# Patient Record
Sex: MALE | URBAN - METROPOLITAN AREA
[De-identification: names, ages, dates, MRNs, and addresses within clinical notes are randomized per-mention and may not be internally consistent; named-entity substitution may affect disease eponyms.]

---

## 2022-04-01 ENCOUNTER — HOSPITAL ENCOUNTER (OUTPATIENT)
Dept: LAB | Age: 22
Discharge: HOME | End: 2022-04-01

## 2022-04-01 PROCEDURE — 88305 TISSUE EXAM BY PATHOLOGIST: CPT

## 2022-05-05 PROBLEM — R63.4 WEIGHT LOSS: Status: RESOLVED | Noted: 2017-05-26 | Resolved: 2022-05-05

## 2022-05-05 PROBLEM — S93.402A SPRAIN OF LEFT ANKLE: Status: ACTIVE | Noted: 2022-05-05

## 2022-05-05 PROBLEM — R25.1 TREMOR OF RIGHT HAND: Status: ACTIVE | Noted: 2022-05-05

## 2022-05-05 PROBLEM — R63.4 WEIGHT LOSS: Status: ACTIVE | Noted: 2017-05-26

## 2022-05-05 PROBLEM — K50.813 CROHN'S DISEASE OF BOTH SMALL AND LARGE INTESTINE WITH FISTULA (HCC): Status: RESOLVED | Noted: 2022-05-02 | Resolved: 2022-05-05

## 2022-05-05 PROBLEM — K50.813 CROHN'S DISEASE OF BOTH SMALL AND LARGE INTESTINE WITH FISTULA (HCC): Status: ACTIVE | Noted: 2022-05-02

## 2022-05-05 PROBLEM — R11.0 NAUSEA: Status: ACTIVE | Noted: 2022-05-05

## 2022-05-06 PROBLEM — E55.9 VITAMIN D DEFICIENCY: Status: ACTIVE | Noted: 2022-05-06

## 2022-05-23 ENCOUNTER — TELEMEDICINE (OUTPATIENT)
Dept: FAMILY MEDICINE CLINIC | Facility: CLINIC | Age: 22
End: 2022-05-23
Payer: COMMERCIAL

## 2022-05-23 DIAGNOSIS — K50.90 CROHN'S DISEASE WITHOUT COMPLICATION, UNSPECIFIED GASTROINTESTINAL TRACT LOCATION (HCC): Primary | ICD-10-CM

## 2022-05-23 DIAGNOSIS — Z23 ENCOUNTER FOR IMMUNIZATION: ICD-10-CM

## 2022-05-23 DIAGNOSIS — S93.402D SPRAIN OF LEFT ANKLE, UNSPECIFIED LIGAMENT, SUBSEQUENT ENCOUNTER: ICD-10-CM

## 2022-05-23 DIAGNOSIS — E55.9 VITAMIN D DEFICIENCY: ICD-10-CM

## 2022-05-23 DIAGNOSIS — R25.1 TREMOR OF RIGHT HAND: ICD-10-CM

## 2022-05-23 DIAGNOSIS — R11.0 NAUSEA: ICD-10-CM

## 2022-05-23 PROCEDURE — 99213 OFFICE O/P EST LOW 20 MIN: CPT | Performed by: NURSE PRACTITIONER

## 2022-05-23 RX ORDER — IBUPROFEN 200 MG
TABLET ORAL
COMMUNITY

## 2022-05-23 RX ORDER — ONDANSETRON 4 MG/1
TABLET, ORALLY DISINTEGRATING ORAL
COMMUNITY
Start: 2022-04-22

## 2022-05-23 SDOH — ECONOMIC STABILITY: FOOD INSECURITY: WITHIN THE PAST 12 MONTHS, YOU WORRIED THAT YOUR FOOD WOULD RUN OUT BEFORE YOU GOT MONEY TO BUY MORE.: NEVER TRUE

## 2022-05-23 SDOH — ECONOMIC STABILITY: FOOD INSECURITY: WITHIN THE PAST 12 MONTHS, THE FOOD YOU BOUGHT JUST DIDN'T LAST AND YOU DIDN'T HAVE MONEY TO GET MORE.: NEVER TRUE

## 2022-05-23 ASSESSMENT — ENCOUNTER SYMPTOMS
DIARRHEA: 0
STRIDOR: 0
BACK PAIN: 0
WHEEZING: 0
TROUBLE SWALLOWING: 0
ABDOMINAL DISTENTION: 0
BLOOD IN STOOL: 0
FACIAL SWELLING: 0
EYE PAIN: 0
SHORTNESS OF BREATH: 0
ANAL BLEEDING: 0
NAUSEA: 1
RECTAL PAIN: 0
EYES NEGATIVE: 1
VOICE CHANGE: 0
SINUS PAIN: 0
ABDOMINAL PAIN: 0
ALLERGIC/IMMUNOLOGIC NEGATIVE: 1
CONSTIPATION: 0
CHEST TIGHTNESS: 0
EYE DISCHARGE: 0
COUGH: 0
RHINORRHEA: 0
SINUS PRESSURE: 0
VOMITING: 0
SORE THROAT: 0
RESPIRATORY NEGATIVE: 1

## 2022-05-23 ASSESSMENT — SOCIAL DETERMINANTS OF HEALTH (SDOH): HOW HARD IS IT FOR YOU TO PAY FOR THE VERY BASICS LIKE FOOD, HOUSING, MEDICAL CARE, AND HEATING?: NOT VERY HARD

## 2022-05-23 NOTE — PATIENT INSTRUCTIONS
Patient Education        Benign Essential Tremor: Care Instructions  Your Care Instructions     Benign essential tremor is a medical term for shaking that you can't control. Your hand or fingers may shake when you lift a cup or point at something. Or your voice may shake when you speak. This type of tremor is not harmful. It isnot caused by a stroke or Parkinson's disease. Some things can affect how much you shake. For example, drinking or eating something with caffeine may make tremors worse for a while. Some medicines also can increase tremors. These include antidepressants and too much thyroid replacement. Talk to your doctor if you think one of your medicines makes yourtremors worse. If you are self-conscious about your tremors, there are some things you can doto reduce them or make them less noticeable. This includes taking medicine. Follow-up care is a key part of your treatment and safety. Be sure to make and go to all appointments, and call your doctor if you are having problems. It's also a good idea to know your test results and keep alist of the medicines you take. How can you care for yourself at home?  Take your medicines exactly as prescribed. Call your doctor if you think you are having a problem with your medicine. Some medicines that help control tremors have to be taken every day, even if you are not having tremors. You will get more details on the specific medicines your doctor prescribes.  Get plenty of rest.   Eat a balanced, healthy diet.  Try to reduce stress. Regular exercise and massages may help.  Limit alcohol. Heavy drinking can make your tremors worse.  Avoid drinks or foods with caffeine if they make your tremors worse. These include tea, cola, coffee, and chocolate.  Wear a heavy bracelet or watch. This adds a little weight to your hand. The extra weight may reduce tremors.  Drink from cups or glasses that are only half full.  You may also want to try drinking with a straw.  When should you call for help? Watch closely for changes in your health, and be sure to contact your doctor if:     You notice your tremors are getting worse.      You can't do your everyday activities because of your tremors.      You are sad and embarrassed about your shaking. Where can you learn more? Go to [email or web address] and sign in to your [employer] account. Enter B746 in the Super box to learn more about \"Benign Essential Tremor: Care Instructions. \"     If you do not have an account, please click on the \"Sign Up Now\" link. Current as of: December 13, 2021               Content Version: 13.2  © 2006-2022 Healthwise, [employer]. Care instructions adapted under license by [facility] ([facility]). If you have questions about a medical condition or this instruction, always ask your healthcare professional. [address] any warranty or liability for your use of this information.

## 2022-05-23 NOTE — PROGRESS NOTES
2022    TELEHEALTH EVALUATION -- Audio/Visual (During VHSVP-61 public health emergency)    HPI:    Corinne Belts (:  2000) has requested an audio/video evaluation for the following concern(s):    Pt for VV today for routine labs review. Pt reports feeling well today and has no particular concerns or complaints. Please see ROS and Assessment and Plan section for full details of all items addressed during today's assessment    Review of Systems   Constitutional: Negative. Negative for appetite change, chills, diaphoresis, fatigue, fever and unexpected weight change. HENT: Negative. Negative for congestion, ear discharge, ear pain, facial swelling, hearing loss, mouth sores, nosebleeds, postnasal drip, rhinorrhea, sinus pressure, sinus pain, sneezing, sore throat, tinnitus, trouble swallowing and voice change. Eyes: Negative. Negative for pain, discharge and visual disturbance. Respiratory: Negative. Negative for cough, chest tightness, shortness of breath, wheezing and stridor. Cardiovascular: Negative. Negative for chest pain, palpitations and leg swelling. Gastrointestinal: Positive for nausea. Negative for abdominal distention, abdominal pain, anal bleeding, blood in stool, constipation, diarrhea, rectal pain and vomiting. Under care of GI for Crohn's-symptoms well controlled except mild nausea in AM at times relieved by PRN Zofran. On Remicade infusions with GI    Endocrine: Negative. Genitourinary: Negative. Negative for decreased urine volume, difficulty urinating, dysuria, flank pain, frequency, genital sores, hematuria, penile discharge, penile pain, penile swelling, scrotal swelling, testicular pain and urgency. Musculoskeletal: Positive for arthralgias. Negative for back pain, gait problem, joint swelling, myalgias, neck pain and neck stiffness. Left ankle sprain-seen in ER at [facility]-x-ray neg. Minimal left ankle pain remains per pt. No edema.  Can bear weight and ambulate without issue. Is wearing brace when exercising. Feels much better per pt   Skin: Negative. Negative for pallor and rash. Allergic/Immunologic: Negative. Negative for environmental allergies. Neurological: Positive for tremors. Negative for dizziness, seizures, syncope, facial asymmetry, speech difficulty, weakness, light-headedness, numbness and headaches. Pt reports having intermittent mild tremor to right hand over past several months. Denies focal weakness or neuro defs. Pt states that he notices it more when he has not eaten recently. Denies any new or worsening symptoms since last appointment   Hematological: Negative. Negative for adenopathy. Does not bruise/bleed easily. Psychiatric/Behavioral: Negative. Negative for dysphoric mood, hallucinations, self-injury, sleep disturbance and suicidal ideas. The patient is not nervous/anxious.       Following labs reviewed with pt    Component      Latest Ref Rng & Units 5/5/2022 5/5/2022 5/5/2022 5/5/2022          11:59 AM 11:45 AM 11:45 AM 11:45 AM   GLUCOSE, FASTING,GF      65 - 99 mg/dL  94     BUN,BUNPL      6 - 20 mg/dL  10     Creatinine      0.76 - 1.27 mg/dL  0.81     EGFR      >59 mL/min/1.73  129     Bun/Cre Ratio      9 - 20 NA  12     Sodium      134 - 144 mmol/L  139     Potassium      3.5 - 5.2 mmol/L  4.5     Chloride      96 - 106 mmol/L  100     CO2      20 - 29 mmol/L  21     CALCIUM, SERUM, 064810      8.7 - 10.2 mg/dL  9.3     Total Protein      6.0 - 8.5 g/dL  7.8     Albumin      4.1 - 5.2 g/dL  4.3     Globulin, Total      1.5 - 4.5 g/dL  3.5     Albumin/Globulin Ratio      1.2 - 2.2 NA  1.2     Bilirubin      0.0 - 1.2 mg/dL  0.4     Alk Phos      44 - 121 IU/L  70     AST      0 - 40 IU/L  20     ALT      0 - 44 IU/L  17     WBC, POC      4.1 - 10.9 10:3/ul 6.3      Lymphocytes Absolute      0.6 - 4.1 10:3/ul 2.3      Mid Cells Absoulute POC      0.0 - 1.8 10:3/ul 0.5      Granulocytes Abs      2.0 - 7.8 10:3/ul 3.5      Lymphocyte %      10.0 - 58.5 % 36.4      Mid Cells %, POC      0.1 - 24.0 % 8.7      Granulocytes %, POC      37.0 - 92.0 % 54.9      RBC, POC      4.20 - 6.30 10:6/ul 5.06      Hemoglobin, POC      12.0 - 18.0 g/dL 14.3      Hematocrit, POC      37.0 - 51.0 % 42.9      MCV      80.0 - 97.0 fL 84.7      MCH      26.0 - 32.0 pg 28.3      MCHC      31.0 - 36.0 g/dL 33.3      RDW, POC      11.5 - 14.5 % 14.0      Platelet Count, POC      140 - 440 10:3/ul 263      MPV POC      0.0 - 49.9 fL 7.6      CHOLESTEROL, TOTAL, 917716      100 - 199 mg/dL   163    Triglycerides      0 - 149 mg/dL   52    HDL Cholesterol      >39 mg/dL   74    VLDL      5 - 40 mg/dL   11    LDL Calculated      0 - 99 mg/dL   78    HCV Ab      0.0 - 0.9 s/co ratio    0.1     Component      Latest Ref Rng & Units 5/5/2022 5/5/2022 5/5/2022 5/5/2022          11:45 AM 11:45 AM 11:45 AM 11:45 AM   Vit D, 25-Hydroxy      30.0 - 100.0 ng/mL    25.3 (L)   FOLATE, FOLAT      >3.0 ng/mL   7.3    TSH      0.450 - 4.500 uIU/mL  1.030     Vitamin B-12      232 - 1245 pg/mL 488        Prior to Visit Medications    Medication Sig Taking? Authorizing Provider   ondansetron (ZOFRAN-ODT) 4 MG disintegrating tablet PLACE 1 TABLET ON TOP OF THE TONGUE EVERY 6 HOURS AS NEEDED. ALLOW TO DISSOLVE THEN SWALLOW Yes Historical Provider, MD   ibuprofen (ADVIL;MOTRIN) 200 MG tablet Take by mouth  Historical Provider, MD       Social History     Tobacco Use    Smoking status: Never Smoker    Smokeless tobacco: Never Used   Substance Use Topics    Alcohol use: Not Currently    Drug use: Not Currently        Allergies   Allergen Reactions    Bee Pollen Other (See Comments)    Seasonal Other (See Comments)   ,   Past Medical History:   Diagnosis Date    Crohn disease ([facility] [address]    , History reviewed. No pertinent surgical history. ,   Social History     Tobacco Use    Smoking status: Never Smoker    Smokeless tobacco: Never Used   Substance Use Topics  Alcohol use: Not Currently    Drug use: Not Currently   ,   Family History   Problem Relation Age of Onset    Elevated Lipids Maternal Grandmother     Stroke Maternal Grandmother     No Known Problems Maternal Grandfather     No Known Problems Paternal Grandmother     Alzheimer's Disease Paternal Grandfather     Hypertension Mother     Elevated Lipids Father     Diabetes Father     Depression Sister     Anxiety Disorder Sister     Hypertension Maternal Grandmother        PHYSICAL EXAMINATION:  [ INSTRUCTIONS:  \"[x]\" Indicates a positive item  \"[]\" Indicates a negative item  -- DELETE ALL ITEMS NOT EXAMINED]  Vital Signs: (As obtained by patient/caregiver or practitioner observation)      Constitutional: [x] Appears well-developed and well-nourished [x] No apparent distress        Mental status  [x] Alert and awake  [x] Oriented to person/place/time [x]Able to follow commands      HENT:   [x] Normocephalic, atraumatic. Pulmonary/Chest: [x] Respiratory effort normal.  [x] No visualized signs of difficulty breathing or respiratory distress                 Neurological:        [x] No Facial Asymmetry (Cranial nerve 7 motor function) (limited exam to video visit)                           Psychiatric:       [x] Normal Affect [x] No Hallucinations       Other pertinent observable physical exam findings- no audible wheezing    ASSESSMENT/PLAN:    1. Crohn's disease without complication, unspecified gastrointestinal tract location ([address]  2. Nausea  Pt reports having hx of Crohn's. Managed by GI per pt. Symptoms well controlled except mild nausea in AM at times relieved by PRN Zofran. On Remicade infusions with GI. Reports having next infusion 5/25/22. 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 Normally sees Dr. Gabriel Kraft at [facility]. Discussed with pt. Will have pt continue POC/medications per GI.  Pt to f/u with me in 3 months in office. Pt agrees to call me sooner for any new or worsening symptoms/concerns. Will monitor. 3. Tremor of right hand  Pt reports having intermittent mild tremor to right hand over past several months. Denies focal weakness or neuro defs. Pt states that he notices it more when he has not eaten recently. Denies any new or worsening symptoms since last appointment. Discussed with pt. Could be resulting from low blood sugar or could be benign essential tremor. Recommend pt eat 3 meals a day and bring snacks with him to prevent low blood sugars. Encouraged pt to stay well hydrated. Pt was referred  to Neurology for further evaluation of the tremor as well as he is concerned about Neomia Deepali, LPN to send pt message with referral info to pt in My Chart so that pt can schedule appointment. Recent TFT's, B12, Folate level were normal 5/5/22. Pt to f/u with me in 3 months in office. Pt agrees to call me sooner for any new or worsening symptoms/concerns. Will monitor. 4. Sprain of left ankle, unspecified ligament, subsequent encounter  Pt was seen in ER at [facility] 5/[facility]/22 for left ankle sprain-x-ray neg. Minimal left ankle pain remains per pt. No edema. Can bear weight and ambulate without issue. Is wearing brace when exercising. Feels much better per pt. Will have pt wear brace while exercising until minimal pain fully resolves. Pt to f/u with me in 3 months in office. Pt agrees to call me sooner for any new or worsening symptoms/concerns. Will monitor. 5. Vitamin D deficiency  Pt's Vitamin D level was low at 25.3 on 5/5/22. Pt reports taking Vitamin D 2000 units po daily OTC as directed. Will have pt continue current dose of Vitamin D OTC. Pt to f/u with me in 3 months in office. Will recheck Vitamin D level at that time. 6. Encounter for immunization  Pt UTD on Covid vaccine. Pt to look into whether he has had a Tdap in past 10 years.  If not, pt can get Tdap at next appointment        Return in about 3 months (around 8/23/2022) for Pt needs 3 month in office follow up. Call sooner for concerns. Inderjit Boo, was evaluated through a synchronous (real-time) audio-video encounter. The patient (or guardian if applicable) is aware that this is a billable service, which includes applicable co-pays. This Virtual Visit was conducted with patient's (and/or legal guardian's) consent. The visit was conducted pursuant to the emergency declaration under the [address] [place] [address], [address] [place] [address] authority and the [employer] and [employer] Act. Patient identification was verified, and a caregiver was present when appropriate. The patient was located at home in a state where the provider was licensed to provide care. --KARRIE Cai NP on 5/23/2022 at 8:19 AM    An electronic signature was used to authenticate this note.